# Patient Record
Sex: MALE | Race: WHITE | NOT HISPANIC OR LATINO | URBAN - METROPOLITAN AREA
[De-identification: names, ages, dates, MRNs, and addresses within clinical notes are randomized per-mention and may not be internally consistent; named-entity substitution may affect disease eponyms.]

---

## 2024-05-15 ENCOUNTER — OFFICE VISIT (OUTPATIENT)
Dept: ORTHOPEDIC SURGERY | Facility: HOSPITAL | Age: 19
End: 2024-05-15
Payer: MEDICAID

## 2024-05-15 ENCOUNTER — HOSPITAL ENCOUNTER (OUTPATIENT)
Dept: RADIOLOGY | Facility: HOSPITAL | Age: 19
Discharge: HOME | End: 2024-05-15
Payer: MEDICAID

## 2024-05-15 VITALS — WEIGHT: 260 LBS | BODY MASS INDEX: 40.81 KG/M2 | HEIGHT: 67 IN

## 2024-05-15 DIAGNOSIS — M25.552 BILATERAL HIP PAIN: Primary | ICD-10-CM

## 2024-05-15 DIAGNOSIS — M25.552 BILATERAL HIP PAIN: ICD-10-CM

## 2024-05-15 DIAGNOSIS — M25.551 BILATERAL HIP PAIN: Primary | ICD-10-CM

## 2024-05-15 DIAGNOSIS — M93.002 SCFE (SLIPPED CAPITAL FEMORAL EPIPHYSIS), LEFT (HHS-HCC): ICD-10-CM

## 2024-05-15 DIAGNOSIS — M25.551 BILATERAL HIP PAIN: ICD-10-CM

## 2024-05-15 PROCEDURE — 72170 X-RAY EXAM OF PELVIS: CPT | Performed by: STUDENT IN AN ORGANIZED HEALTH CARE EDUCATION/TRAINING PROGRAM

## 2024-05-15 PROCEDURE — 72170 X-RAY EXAM OF PELVIS: CPT

## 2024-05-15 PROCEDURE — 99214 OFFICE O/P EST MOD 30 MIN: CPT | Performed by: ORTHOPAEDIC SURGERY

## 2024-05-15 PROCEDURE — 1036F TOBACCO NON-USER: CPT | Performed by: ORTHOPAEDIC SURGERY

## 2024-05-15 ASSESSMENT — PAIN - FUNCTIONAL ASSESSMENT: PAIN_FUNCTIONAL_ASSESSMENT: NO/DENIES PAIN

## 2024-05-15 NOTE — PROGRESS NOTES
Chief Complaint: Bilateral hip pain    History: 18 y.o. male follows up with history of bilateral slipped capital femoral epiphysis.  He notes pain in both hips that is aggravated by work.  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  After a long day of work he cannot sit on a stool as it is too painful.  His pain is over his anterior groin on both sides.  He has tried Advil, ice and heat for his symptoms.  The pain is a little bit worse on the left side    Physical Exam: He does have pain with an impingement test on both sides that seems to reproduce his symptoms.  Hip flexion is 80/90.  Hip abduction and flexion is 55/55.  Hip internal rotation is -10/15 and external rotation is 30/30.  His external foot progression angle is 40/25    Imaging that was personally reviewed: Radiographs demonstrate 2 screws on the right side and 1 on the left without any obvious change in position on either side.  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 male with bilateral slipped capital femoral epiphysis.  I referred him to physical therapy and provided a impingement protocol for this.  His hip symptoms are bad enough that I do think that an MRI without contrast is very reasonable to get as well to assess for a potential labral tear and to quantify the amount of cam morphology that he has.  After this is complete I will review his findings with my hip partner and reach out to the family with plans.      ** This office note was dictated using Dragon voice to text software and was not proofread for spelling or grammatical errors **      Addendum 6/19/24: MRI reviewed, there is no obvious labral tear on the left side upon my interpretation.  I called mother to relay the information and left a message to the fact that without any obvious labral tear I do recommend him proceeding with physical therapy using the special impingement protocol handout that we provided in clinic.  If he does not  improve with this then I would like him to see my partner Dr. Lee for further assessment to see if anything might be beneficial to improve his hip function and pain.  I will have my office try to reach out to the family again tomorrow to make sure that they do not have any questions with this plan.

## 2024-06-13 ENCOUNTER — APPOINTMENT (OUTPATIENT)
Dept: RADIOLOGY | Facility: CLINIC | Age: 19
End: 2024-06-13
Payer: MEDICAID

## 2024-06-17 ENCOUNTER — TELEPHONE (OUTPATIENT)
Dept: ORTHOPEDIC SURGERY | Facility: HOSPITAL | Age: 19
End: 2024-06-17
Payer: MEDICAID

## 2024-06-17 ENCOUNTER — HOSPITAL ENCOUNTER (OUTPATIENT)
Dept: RADIOLOGY | Facility: HOSPITAL | Age: 19
Discharge: HOME | End: 2024-06-17
Payer: MEDICAID

## 2024-06-17 DIAGNOSIS — M93.002 SCFE (SLIPPED CAPITAL FEMORAL EPIPHYSIS), LEFT (HHS-HCC): ICD-10-CM

## 2024-06-17 PROCEDURE — 73721 MRI JNT OF LWR EXTRE W/O DYE: CPT | Mod: LEFT SIDE | Performed by: RADIOLOGY

## 2024-06-17 PROCEDURE — 73721 MRI JNT OF LWR EXTRE W/O DYE: CPT | Mod: LT

## 2024-06-17 NOTE — TELEPHONE ENCOUNTER
SYMPTOM PHONE CALL    Name of Patient: Brianna Giang  Parent or Guardian's Name: Ms. Weiner      Reason for Call: MRI results    Additional Information: Ms. Weiner would like to speak with someone regarding her  grandson. Brianna Giang had his mri on 06/17/2024 and she would like to know his results. Please reach out to Ms. Weiner as soon as possible.       Call Back Number: [phone]